# Patient Record
Sex: MALE
[De-identification: names, ages, dates, MRNs, and addresses within clinical notes are randomized per-mention and may not be internally consistent; named-entity substitution may affect disease eponyms.]

---

## 2017-12-01 ENCOUNTER — HOSPITAL ENCOUNTER (EMERGENCY)
Dept: HOSPITAL 31 - C.ER | Age: 57
Discharge: HOME | End: 2017-12-01
Payer: COMMERCIAL

## 2017-12-01 VITALS
SYSTOLIC BLOOD PRESSURE: 135 MMHG | RESPIRATION RATE: 16 BRPM | HEART RATE: 70 BPM | TEMPERATURE: 97.9 F | DIASTOLIC BLOOD PRESSURE: 78 MMHG

## 2017-12-01 VITALS — OXYGEN SATURATION: 97 %

## 2017-12-01 VITALS — BODY MASS INDEX: 30.8 KG/M2

## 2017-12-01 DIAGNOSIS — I10: ICD-10-CM

## 2017-12-01 DIAGNOSIS — M25.551: Primary | ICD-10-CM

## 2017-12-01 PROCEDURE — 99284 EMERGENCY DEPT VISIT MOD MDM: CPT

## 2017-12-01 PROCEDURE — 96372 THER/PROPH/DIAG INJ SC/IM: CPT

## 2017-12-01 NOTE — C.PDOC
History Of Present Illness





NEW ONSET L HIP/THIGH PAIN X 11 DAYS. NO TRAUMA. WORSE W WT BEAR, HIP MOVEMENT. 

RELIEVES W REST. DENIES HO CHRONIC BACK PAIN. NO ASSOC WEAK/NUMB, OTHER SX. +

RELIEF W TYLENOL. PS DOES ALOT OF DAILY WALKING





EXAM


MILD DIST NONTOXIC


EXT R HIP AROM WO DIFF, WT BEAR WO DIFF NONTEND ATRAUM. L HIP: +PAIN W WT BEAR. 

LIMITED FLEX TO 90 DUE TO PAIN. LIMITED INT ROTATION DUE TO PAIN. FULL EXT 

ROTATION WO DIFF. NONTEND. ATRAUM. NEG STRAIGHT LEG


BACK AROM WO DIFF. 


REMAINDER NEG





MDM


?HIP ARTHRITIS VS BURSITIS VS SCIATICA. ADVISED NEED FOR OUTPT ORTHO FU, 

POSSIBLE MRI


Time Seen by Provider: 12/01/17 17:11


Chief Complaint (Nursing): Hip Pain


History Per: Patient


History/Exam Limitations: no limitations


Onset/Duration Of Symptoms: Days (11 days)





Past Medical History


Reviewed: Historical Data, Nursing Documentation, Vital Signs


Vital Signs: 


 Last Vital Signs











Temp  97.9 F   12/01/17 17:36


 


Pulse  70   12/01/17 17:36


 


Resp  16   12/01/17 17:36


 


BP  135/78   12/01/17 17:36


 


Pulse Ox  98   12/01/17 17:36














- Medical History


PMH: HTN (NOT ON MEDS)


Surgical History: Appendectomy


Family History: States: No Known Family Hx





- Social History


Hx Tobacco Use: No


Hx Alcohol Use: Yes


Hx Substance Use: No





- Immunization History


Hx Tetanus Toxoid Vaccination: No


Hx Influenza Vaccination: No


Hx Pneumococcal Vaccination: No





Review Of Systems


Except As Marked, All Systems Reviewed And Found Negative.


Musculoskeletal: Positive for: Other ((+) Left hip/Thigh pain).  Negative for: 

Back Pain, Hand Pain


Neurological: Negative for: Weakness, Numbness





Physical Exam





- Physical Exam


Appears: Non-toxic, In Acute Distress (Mild)


Skin: Warm, Dry, No Rash


Head: Atraumatic, Normacephalic


Oral Mucosa: Moist


Respiratory: Normal Breath Sounds


Back: Normal Inspection, No Straight Leg Raising


Extremity: Other (Right Hip - AROM w/p difficulty.Weight bearing w/o 

difficulty. Nontender atramatic. Left Hip - Pain with weight bearing. Limited 

flex to 90 due to pain. Limited internal rotation due to pain. Full external 

rotation w/o difficulty. Nontender. Atramatic.)


Neurological/Psych: Oriented x3, Normal Speech, Normal Motor





ED Course And Treatment


O2 Sat by Pulse Oximetry: 97 (RA)


Pulse Ox Interpretation: Normal





Medical Decision Making


Medical Decision Making: 


PLAN:


* Flexeril PO


* Decadron PO 


* Toradol IM 





NOTE:


?HIP ARTHRITIS VS BURSITIS VS SCIATICA. ADVISED NEED FOR OUTPT ORTHO FU, 

POSSIBLE MRI





Disposition


Counseled Patient/Family Regarding: Diagnosis, Need For Followup, Rx Given





- Disposition


Referrals: 


[facility] [Outside]


[facility] at [facility] [Outside]


Garret Mary III, MD [Staff Provider] - 


Disposition: HOME/ ROUTINE


Disposition Time: 17:31


Condition: IMPROVED


Prescriptions: 


Cyclobenzaprine [Flexeril] 10 mg PO TID #15 tab


Ibuprofen [Motrin] 600 mg PO Q6 #30 tab


Instructions:  Hip Bursitis (ED), Hip Pain (ED)


Forms:  [facility] (English), Work Excuse





- Clinical Impression


Clinical Impression: 


 Hip pain








- Scribe Statement


The provider has reviewed the documentation as recorded by the Ye Ding


Provider Attestation: 


All medical record entries made by the Brittibcarolyne were at my direction and 

personally dictated by me. I have reviewed the chart and agree that the record 

accurately reflects my personal performance of the history, physical exam, 

medical decision making, and the department course for this patient. I have 

also personally directed, reviewed, and agree with the discharge instructions 

and disposition.

## 2019-04-17 ENCOUNTER — HOSPITAL ENCOUNTER (EMERGENCY)
Dept: HOSPITAL 31 - C.ER | Age: 59
Discharge: HOME | End: 2019-04-17
Payer: SELF-PAY

## 2019-04-17 VITALS — DIASTOLIC BLOOD PRESSURE: 96 MMHG | SYSTOLIC BLOOD PRESSURE: 170 MMHG | TEMPERATURE: 97.7 F | HEART RATE: 66 BPM

## 2019-04-17 VITALS — OXYGEN SATURATION: 98 %

## 2019-04-17 VITALS — BODY MASS INDEX: 29.2 KG/M2

## 2019-04-17 VITALS — RESPIRATION RATE: 20 BRPM

## 2019-04-17 DIAGNOSIS — I10: ICD-10-CM

## 2019-04-17 DIAGNOSIS — M25.562: Primary | ICD-10-CM

## 2019-04-17 NOTE — RAD
Date of service: 



04/17/2019



PROCEDURE:  Left Knee Radiographs.



HISTORY:

Pain.



COMPARISON:

02/28/2016.



TECHNIQUE:

3 views obtained.



FINDINGS:



BONES:

Bone alignment and mineralization are normal.  There is no acute 

displaced fracture or bone destruction.



JOINTS:

There is redemonstration of mild tricompartmental degenerative 

osteoarthrosis with reduced joint spaces, marginal osteophytes and 

tibial spiking, worse in the medial compartment. 



JOINT EFFUSION:

None. 



OTHER FINDINGS:

None.



IMPRESSION:

No acute fracture or dislocation.



Mild tricompartmental degenerative osteoarthrosis, worse in the 

medial compartment.  No significant interval change.

## 2019-04-17 NOTE — C.PDOC
History Of Present Illness





58 y/o male presents to ED complaining of pain in the left knee for the past 

year. Patient states he has tried over the counter medications but with no 

relief. He reports he got diclofenac 100mg that still didnt help. He states 

that the pain has gotten worse for the last 2 weeks and complains that when he 

walks, he feels his bones are rubbing against each other. Patient denies any 

other physical complaints. 





Time Seen by Provider: 04/17/19 07:52


Chief Complaint (Nursing): Lower Extremity Problem/Injury


History Per: Patient


History/Exam Limitations: no limitations


Onset/Duration Of Symptoms: Days


Current Symptoms Are (Timing): Still Present





Past Medical History


Reviewed: Historical Data, Nursing Documentation, Vital Signs


Vital Signs: 





                                Last Vital Signs











Temp  97.6 F   04/17/19 07:34


 


Pulse  70   04/17/19 07:34


 


Resp  20   04/17/19 07:34


 


BP  154/81 H  04/17/19 07:34


 


Pulse Ox  98   04/17/19 07:34














- Medical History


PMH: HTN (NOT ON MEDS)


Surgical History: Appendectomy


Family History: States: No Known Family Hx





- Social History


Hx Tobacco Use: No


Hx Alcohol Use: Yes


Hx Substance Use: No





- Immunization History


Hx Tetanus Toxoid Vaccination: No


Hx Influenza Vaccination: No


Hx Pneumococcal Vaccination: No





Review Of Systems


Except As Marked, All Systems Reviewed And Found Negative.


Constitutional: Negative for: Fever


Musculoskeletal: Positive for: Other (Left Knee Pain).  Negative for: Back Pain


Neurological: Negative for: Weakness, Numbness





Physical Exam





- Physical Exam


Appears: Non-toxic, No Acute Distress


Skin: Warm, Dry


Head: Atraumatic, Normacephalic


Eye(s): bilateral: Normal Inspection


Oral Mucosa: Moist


Neck: Supple


Chest: Symmetrical


Extremity: Tenderness (left knee diffusely tender to palpation in medial area), 

No Deformity, No Swelling, Other (no erythema, no warmth, no wound; has pain 

when flexing the knee)


Extremity: Bilateral: Normal Color And Temperature


Neurological/Psych: Oriented x3, Normal Speech, Normal Motor, Normal Sensation





ED Course And Treatment


O2 Sat by Pulse Oximetry: 98 (RA)


Pulse Ox Interpretation: Normal





- Other Rad


  ** Knee XR


X-Ray: Read By Radiologist


Interpretation: FINDINGS:  BONES:  Bone alignment and mineralization are normal.

 There is no acute displaced fracture or bone destruction.  JOINTS:  There is 

redemonstration of mild tricompartmental degenerative osteoarthrosis with 

reduced joint spaces, marginal osteophytes and tibial spiking, worse in the 

medial compartment.  JOINT EFFUSION:  None.  OTHER FINDINGS:  None.  IMPRESSION:

 No acute fracture or dislocation.  Mild tricompartmental degenerative 

osteoarthrosis, worse in the medial compartment.  No significant interval 

change.


Progress Note: Left knee XR ordered. Patient was put on a knee brace.





Disposition





- Disposition


Referrals: 


[facility] at [facility] [Outside]


Angie Potts MD [Staff Provider] - 


Disposition: HOME/ ROUTINE


Disposition Time: 09:36


Condition: GOOD


Additional Instructions: 


Follow up with PMD/Clinic and Orthopedist/clinic within 2-3 days. Return to ED 

if feel worse.


Prescriptions: 


traMADol [Ultram] 50 mg PO Q6 #10 tab


Instructions:  Knee Pain (DC)


Forms:  [facility] (English)





- Clinical Impression


Clinical Impression: 


 Knee pain








- PA / NP / Resident Statement


MD/DO has reviewed & agrees with the documentation as recorded.





- Scribe Statement


The provider has reviewed the documentation as recorded by the Scribcarolyne Leiva





All medical record entries made by the Brittibcarolyne were at my direction and 

personally dictated by me. I have reviewed the chart and agree that the record 

accurately reflects my personal performance of the history, physical exam, 

medical decision making, and the department course for this patient. I have also

 personally directed, reviewed, and agree with the discharge instructions and 

disposition.